# Patient Record
Sex: MALE | Race: BLACK OR AFRICAN AMERICAN | Employment: UNEMPLOYED | URBAN - METROPOLITAN AREA
[De-identification: names, ages, dates, MRNs, and addresses within clinical notes are randomized per-mention and may not be internally consistent; named-entity substitution may affect disease eponyms.]

---

## 2021-05-31 ENCOUNTER — HOSPITAL ENCOUNTER (INPATIENT)
Age: 21
LOS: 1 days | Discharge: LEFT BEFORE DISCHARGE | DRG: 885 | End: 2021-06-01
Attending: PSYCHIATRY & NEUROLOGY | Admitting: PSYCHIATRY & NEUROLOGY
Payer: COMMERCIAL

## 2021-05-31 PROBLEM — F39 UNSPECIFIED MOOD (AFFECTIVE) DISORDER (HCC): Status: ACTIVE | Noted: 2021-05-31

## 2021-05-31 PROCEDURE — 74011250637 HC RX REV CODE- 250/637: Performed by: NURSE PRACTITIONER

## 2021-05-31 PROCEDURE — 65220000003 HC RM SEMIPRIVATE PSYCH

## 2021-05-31 RX ORDER — LORAZEPAM 2 MG/ML
1 INJECTION INTRAMUSCULAR
Status: DISCONTINUED | OUTPATIENT
Start: 2021-05-31 | End: 2021-06-01 | Stop reason: HOSPADM

## 2021-05-31 RX ORDER — TRAZODONE HYDROCHLORIDE 50 MG/1
50 TABLET ORAL
Status: DISCONTINUED | OUTPATIENT
Start: 2021-05-31 | End: 2021-06-01 | Stop reason: HOSPADM

## 2021-05-31 RX ORDER — HYDROXYZINE 50 MG/1
50 TABLET, FILM COATED ORAL
Status: DISCONTINUED | OUTPATIENT
Start: 2021-05-31 | End: 2021-06-01 | Stop reason: HOSPADM

## 2021-05-31 RX ORDER — PHENOBARBITAL 32.4 MG/1
16.2 TABLET ORAL 2 TIMES DAILY
Status: DISCONTINUED | OUTPATIENT
Start: 2021-06-02 | End: 2021-06-01 | Stop reason: HOSPADM

## 2021-05-31 RX ORDER — DIPHENHYDRAMINE HYDROCHLORIDE 50 MG/ML
50 INJECTION, SOLUTION INTRAMUSCULAR; INTRAVENOUS
Status: DISCONTINUED | OUTPATIENT
Start: 2021-05-31 | End: 2021-06-01 | Stop reason: HOSPADM

## 2021-05-31 RX ORDER — LANOLIN ALCOHOL/MO/W.PET/CERES
100 CREAM (GRAM) TOPICAL DAILY
Status: DISCONTINUED | OUTPATIENT
Start: 2021-06-01 | End: 2021-06-01 | Stop reason: HOSPADM

## 2021-05-31 RX ORDER — ACETAMINOPHEN 325 MG/1
650 TABLET ORAL
Status: DISCONTINUED | OUTPATIENT
Start: 2021-05-31 | End: 2021-06-01 | Stop reason: HOSPADM

## 2021-05-31 RX ORDER — PHENOBARBITAL 32.4 MG/1
32.4 TABLET ORAL 4 TIMES DAILY
Status: DISCONTINUED | OUTPATIENT
Start: 2021-05-31 | End: 2021-06-01 | Stop reason: HOSPADM

## 2021-05-31 RX ORDER — OLANZAPINE 5 MG/1
5 TABLET ORAL
Status: DISCONTINUED | OUTPATIENT
Start: 2021-05-31 | End: 2021-06-01 | Stop reason: HOSPADM

## 2021-05-31 RX ORDER — ADHESIVE BANDAGE
30 BANDAGE TOPICAL DAILY PRN
Status: DISCONTINUED | OUTPATIENT
Start: 2021-05-31 | End: 2021-06-01 | Stop reason: HOSPADM

## 2021-05-31 RX ORDER — PHENOBARBITAL 32.4 MG/1
16.2 TABLET ORAL
Status: DISCONTINUED | OUTPATIENT
Start: 2021-06-02 | End: 2021-06-01 | Stop reason: HOSPADM

## 2021-05-31 RX ORDER — HALOPERIDOL 5 MG/ML
5 INJECTION INTRAMUSCULAR
Status: DISCONTINUED | OUTPATIENT
Start: 2021-05-31 | End: 2021-06-01 | Stop reason: HOSPADM

## 2021-05-31 RX ORDER — THERA TABS 400 MCG
1 TAB ORAL DAILY
Status: DISCONTINUED | OUTPATIENT
Start: 2021-06-01 | End: 2021-06-01 | Stop reason: HOSPADM

## 2021-05-31 RX ORDER — PHENOBARBITAL 32.4 MG/1
32.4 TABLET ORAL
Status: DISCONTINUED | OUTPATIENT
Start: 2021-05-31 | End: 2021-06-01 | Stop reason: HOSPADM

## 2021-05-31 RX ORDER — BENZTROPINE MESYLATE 1 MG/1
1 TABLET ORAL
Status: DISCONTINUED | OUTPATIENT
Start: 2021-05-31 | End: 2021-06-01 | Stop reason: HOSPADM

## 2021-05-31 RX ORDER — FOLIC ACID 1 MG/1
1 TABLET ORAL DAILY
Status: DISCONTINUED | OUTPATIENT
Start: 2021-06-01 | End: 2021-06-01 | Stop reason: HOSPADM

## 2021-05-31 RX ORDER — PHENOBARBITAL 32.4 MG/1
32.4 TABLET ORAL 2 TIMES DAILY
Status: DISCONTINUED | OUTPATIENT
Start: 2021-06-01 | End: 2021-06-01 | Stop reason: HOSPADM

## 2021-05-31 RX ADMIN — PHENOBARBITAL 32.4 MG: 32.4 TABLET ORAL at 13:01

## 2021-05-31 RX ADMIN — PHENOBARBITAL 32.4 MG: 32.4 TABLET ORAL at 17:37

## 2021-05-31 RX ADMIN — HYDROXYZINE HYDROCHLORIDE 50 MG: 50 TABLET, FILM COATED ORAL at 07:22

## 2021-05-31 NOTE — PROGRESS NOTES
Problem: Depressed Mood (Adult/Pediatric)  Goal: *STG: Participates in treatment plan  Outcome: Progressing Towards Goal     Problem: Depressed Mood (Adult/Pediatric)  Goal: *STG: Verbalizes anger, guilt, and other feelings in a constructive manor  Outcome: Progressing Towards Goal     Problem: Depressed Mood (Adult/Pediatric)  Goal: *STG: Remains safe in hospital  Outcome: Progressing Towards Goal     Problem: Depressed Mood (Adult/Pediatric)  Goal: Interventions  Outcome: Progressing Towards Goal  Note: Pt is alert and oriented. Calm cooperative. Verbalizes needs to staff. Anxious mood sad affect. Visible on the unit. Pt reports drinking every weekend unknown amounts of various alcohol. Denies PTA medication. TDO hearing with [facility] is scheduled for Tues 6/1/2021 @ 4695. 0194-Pt is meeting with the evaluator.      [address] [address]  Master Treatment Plan for Bassem Lat    Date Treatment Plan Initiated: 5/31/2021    Treatment Plan Modalities:  Type of Modality Amount  (x minutes) Frequency (x/week) Duration (x days) Name of Responsible Staff   [address]  meetings to encourage peer interactions 15 7 1 Keyur CASEY     Group psychotherapy to assist in building coping skills and internal controls 60 7 1 Umu Mohanm   Therapeutic activity groups to build coping skills 60 7 1 Heba Hatoum   Psychoeducation in group setting to address:   Medication education   15 7 1 Cisco Conde PharmD   Coping skills   30 3 1 Hecorrine Hatgigim   Relaxation techniques         Symptom management         Discharge planning   60 [address] 3 2 1 Chaplain GONZALEZ   60 1 1 volunteer   Recovery/AA/NA      volunteer   Physician medication management   15 7 1 Dr Kady Orr NP   Family meeting/discharge planning   15 2 [address] [facility] and [employer]

## 2021-05-31 NOTE — BH NOTES
TRANSFER - IN REPORT:    Verbal report received from [facility],[address], RN(name) on Malinda Ibanez  being received from [facility](unit) for routine progression of care      Report consisted of patients Situation, Background, Assessment and   Recommendations(SBAR). Information from the following report(s) SBAR, ED Summary, Intake/Output, MAR and Recent Results was reviewed with the receiving nurse. Opportunity for questions and clarification was provided. Assessment completed upon patients arrival to unit and care assumed.

## 2021-05-31 NOTE — PROGRESS NOTES
Problem: Depressed Mood (Adult/Pediatric)  Goal: *STG: Participates in treatment plan  5/31/2021 1556 by Leonid Ventura  Outcome: Progressing Towards Goal  5/31/2021 0817 by Leonid Ventura  Outcome: Progressing Towards Goal     Problem: Depressed Mood (Adult/Pediatric)  Goal: *STG: Remains safe in hospital  5/31/2021 1556 by Leonid Ventura  Outcome: Progressing Towards Goal     Problem: Depressed Mood (Adult/Pediatric)  Goal: *STG: Complies with medication therapy  Outcome: Progressing Towards Goal     Problem: Depressed Mood (Adult/Pediatric)  Goal: Interventions  5/31/2021 1556 by Leonid Ventura  Outcome: Progressing Towards Goal  Note: Pt compliant with scheduled medication. Minimal self disclosure provided. Depressed anxious. Pt appears guarded. Alert oriented calm.  CIWA 0.

## 2021-05-31 NOTE — INTERDISCIPLINARY ROUNDS
Behavioral Health Interdisciplinary Rounds Patient Name: Davie Both  Age: 22 y.o. Room/Bed:  732/02 Primary Diagnosis: <principal problem not specified> Admission Status: TDO Readmission within 30 days: no 
Power of  in place: no 
Patient requires a blocked bed: no          Reason for blocked bed: VTE Prophylaxis: No 
 
Mobility needs/Fall risk: no 
Flu Vaccine :  
Nutritional Plan: no 
Consults:  H+P Labs/Testing due today?: no 
 
Sleep hours:  1.5 Participation in Care/Groups:  New admit Medication Compliant?: New admit PRNS (last 24 hours): None Restraints (last 24 hours):  no 
  
CIWA (range last 24 hours): COWS (range last 24 hours): Alcohol screening (AUDIT) completed -   AUDIT Score: 4 If applicable, date SBIRT discussed in treatment team AND documented:  
AUDIT Screen Score: AUDIT Score: 4 Document Brief Intervention (corresponds directly with the 5 A's, Ask, Advise, Assess, Assist, and Arrange): At- Risk Patients (Score 7-15 for women; 8-15 for men) Discuss concern patient is drinking at unhealthy levels known to increase risk of alcohol-related health problems. Is Patient ready to commit to change? If No: 
 Encourage reflection  Discuss short term and long term health risks of consuming alcohol  Barriers to change  Reaffirm willingness to help / Educational materials provided If Yes: 
 Set goal 
 Plan  Educational materials provided Harmful use or Dependence (Score 16 or greater)  Discuss short term and long term health risks of consuming alcohol  Recommendations  Negotiate drinking goal 
 Recommend addiction specialist/center  Arrange follow-up appointments. Tobacco - patient is a smoker:   
Illegal Drugs use:   
 
24 hour chart check complete: yes Patient goal(s) for today:  
Treatment team focus/goals:  
Progress note LOS:  0  Expected LOS:  
 
Financial concerns/prescription coverage: Family contact:    
Family requesting physician contact today:   
Discharge plan: Access to weapons :      
Outpatient provider(s):  
Patient's preferred phone number for follow up call :  
Patient's preferred e-mail address : 
Participating treatment team members: Elijah Walker, * (assigned SW),

## 2021-05-31 NOTE — BH NOTES
Admitted from [facility] ER  TDO  Cooperative, flat, and tearful. Denies SI/HI  +THC  +ETOH on weekends  Will continue to monitor    Primary Nurse Agnieszka Carvajal and Greg Mishra, RN performed a dual skin assessment on this patient No impairment noted  Elia score is 21      Marcelox Rodriguez NP aware of H+P.
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
## 2021-05-31 NOTE — BH NOTES
0725- PRN Medication Documentation    Specific patient behavior that led to need for PRN medication: pt c/o anxiety increased due to being in the hospital   Staff interventions attempted prior to PRN being given: education, therapeutic communication   PRN medication given: po 50 mg Atarax   Patient response/effectiveness of PRN medication: pt is alert oriented visible on the unit
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
## 2021-06-01 VITALS
BODY MASS INDEX: 31.5 KG/M2 | SYSTOLIC BLOOD PRESSURE: 172 MMHG | RESPIRATION RATE: 13 BRPM | HEART RATE: 99 BPM | HEIGHT: 71 IN | DIASTOLIC BLOOD PRESSURE: 116 MMHG | WEIGHT: 225 LBS | TEMPERATURE: 97.9 F | OXYGEN SATURATION: 100 %

## 2021-06-01 LAB — TSH SERPL DL<=0.05 MIU/L-ACNC: 0.48 UIU/ML (ref 0.36–3.74)

## 2021-06-01 PROCEDURE — 36415 COLL VENOUS BLD VENIPUNCTURE: CPT

## 2021-06-01 PROCEDURE — 84443 ASSAY THYROID STIM HORMONE: CPT

## 2021-06-01 PROCEDURE — 74011250637 HC RX REV CODE- 250/637: Performed by: NURSE PRACTITIONER

## 2021-06-01 RX ORDER — CLONIDINE HYDROCHLORIDE 0.1 MG/1
0.1 TABLET ORAL
Status: DISCONTINUED | OUTPATIENT
Start: 2021-06-01 | End: 2021-06-01 | Stop reason: HOSPADM

## 2021-06-01 RX ADMIN — FOLIC ACID 1 MG: 1 TABLET ORAL at 08:46

## 2021-06-01 RX ADMIN — CLONIDINE HYDROCHLORIDE 0.1 MG: 0.1 TABLET ORAL at 08:46

## 2021-06-01 RX ADMIN — Medication 100 MG: at 08:46

## 2021-06-01 RX ADMIN — PHENOBARBITAL 32.4 MG: 32.4 TABLET ORAL at 08:46

## 2021-06-01 RX ADMIN — THERA TABS 1 TABLET: TAB at 08:46

## 2021-06-01 NOTE — PROGRESS NOTES
Problem: Falls - Risk of  Goal: *Absence of Falls  Description: Document Henry Suh Fall Risk and appropriate interventions in the flowsheet. Outcome: Progressing Towards Goal  Note: Fall Risk Interventions:            Medication Interventions: Teach patient to arise slowly              Resting in bed with eyes closed, no complaints, no distress noted. Safety measures in place, will continue to monitor.

## 2021-06-01 NOTE — DISCHARGE INSTRUCTIONS
DISCHARGE SUMMARY    Ivory Graf  : 2000  MRN: [id number]    The patient Corrine Yost exhibits the ability to control behavior in a less restrictive environment. Patient's level of functioning is improving. No assaultive/destructive behavior has been observed for the past 24 hours. No suicidal/homicidal threat or behavior has been observed for the past 24 hours. There is no evidence of serious medication side effects. Patient has not been in physical or protective restraints for at least the past 24 hours. If weapons involved, how are they secured? No weapons involved     Is patient aware of and in agreement with discharge plan? He was released from his hearing     Arrangements for medication:  Released from his hearing     Copy of discharge instructions to provider?:  Yes, fax to [address] [address]     Arrangements for transportation home:  Patient to arrange   Keep all follow up appointments as scheduled, continue to take prescribed medications per physician instructions. Mental health crisis number:  758 or your local mental health crisis line number at   [facility] [address] [facility] WARM LINE      [phone]-MH ([phone])      M-F: 9am to 9pm      Sat & Sun: 5pm - 9pm  National suicide prevention lines:                             9-496-MWRWPKR (5-497-748-572-716-7008)       1-906-594-TALK ([phone])    Crisis Text Line:  Text HOME to 152112      AMA discharge, no prescriptions given to patient. Patient discharged home with family. Belongings returned to patient. DISCHARGE SUMMARY from Nurse    PATIENT INSTRUCTIONS:    What to do at Home:  Recommended activity: Activity as tolerated,     *  Please give a list of your current medications to your Primary Care Provider. *  Please update this list whenever your medications are discontinued, doses are      changed, or new medications (including over-the-counter products) are added.     *  Please carry medication information at all times in case of emergency situations. These are general instructions for a healthy lifestyle:    No smoking/ No tobacco products/ Avoid exposure to second hand smoke  Surgeon General's Warning:  Quitting smoking now greatly reduces serious risk to your health. Obesity, smoking, and sedentary lifestyle greatly increases your risk for illness    A healthy diet, regular physical exercise & weight monitoring are important for maintaining a healthy lifestyle    You may be retaining fluid if you have a history of heart failure or if you experience any of the following symptoms:  Weight gain of 3 pounds or more overnight or 5 pounds in a week, increased swelling in our hands or feet or shortness of breath while lying flat in bed. Please call your doctor as soon as you notice any of these symptoms; do not wait until your next office visit. The discharge information has been reviewed with the patient. The patient verbalized understanding. Discharge medications reviewed with the patient and appropriate educational materials and side effects teaching were provided.   ___________________________________________________________________________________________________________________________________
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
## 2021-06-01 NOTE — INTERDISCIPLINARY ROUNDS
Behavioral Health Interdisciplinary Rounds Patient Name: Sherine La  Age: 22 y.o. Room/Bed:  732/02 Primary Diagnosis: <principal problem not specified> Admission Status: He was released from his hearing Readmission within 30 days: no 
Power of  in place: no 
Patient requires a blocked bed: no          Reason for blocked bed: VTE Prophylaxis: No 
 
Mobility needs/Fall risk: no 
Flu Vaccine :  
Nutritional Plan: no 
Consults:         
Labs/Testing due today?: yes Sleep hours:  7 Participation in Care/Groups:   
Medication Compliant?: Selective PRNS (last 24 hours): Antianxiety Restraints (last 24 hours):  no 
  
CIWA (range last 24 hours): CIWA-Ar Total: 0  
COWS (range last 24 hours): Alcohol screening (AUDIT) completed -   AUDIT Score: 4 If applicable, date SBIRT discussed in treatment team AND documented:  
AUDIT Screen Score: AUDIT Score: 4 Tobacco - patient is a smoker: Have You Used Tobacco in the Past 30 Days: No 
Illegal Drugs use: Have You Used Any Illegal Substances Over the Past 12 Months: Yes 
 
24 hour chart check complete: yes Patient goal(s) for today:  
Treatment team focus/goals: He was released from his hearing Progress note : he was released from his hearing LOS:  1  Expected LOS: Today Financial concerns/prescription coverage:   
Family contact:    
Family requesting physician contact today:   
Discharge plan:he was released from his hearing Access to weapons :  No     
Outpatient provider(s): [facility] referral  
Patient's preferred phone number for follow up call :  
Patient's preferred e-mail address : 
Participating treatment team members: Lindsay Hurtado Dr.

## 2021-06-01 NOTE — PROGRESS NOTES
Problem: Depressed Mood (Adult/Pediatric)  Goal: *STG: Participates in treatment plan  Outcome: Progressing Towards Goal  Goal: *STG: Verbalizes anger, guilt, and other feelings in a constructive manor  Outcome: Progressing Towards Goal  Goal: *STG: Attends activities and groups  Outcome: Progressing Towards Goal  Goal: *STG: Complies with medication therapy  Outcome: Progressing Towards Goal       Visible on the unit, sitting in the dining room watching TV with peers. Cooperative, sad, anxious, withdrawn. CIWA: 0  Denies any symptoms of withdrawal.  BP elevated, charge nurse made aware. Orders received from [address] [place] [address], NP for PRN clonidine 0.1mg      Pt released at TDO hearing this morning. Per NP, discharge AMA.

## 2021-06-01 NOTE — PROGRESS NOTES
Chart reviewed. Discussed with RN. Patient refusing blood work to be done. Thank you for allowing us to participate in patient's care. If you have any questions or need further assistance, please do not hesitate to contact us again. We will sign off now.
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
